# Patient Record
Sex: FEMALE | Race: WHITE | URBAN - METROPOLITAN AREA
[De-identification: names, ages, dates, MRNs, and addresses within clinical notes are randomized per-mention and may not be internally consistent; named-entity substitution may affect disease eponyms.]

---

## 2018-09-07 ENCOUNTER — HOSPITAL ENCOUNTER (OUTPATIENT)
Dept: MRI IMAGING | Age: 23
Discharge: HOME | End: 2018-09-07
Attending: ORTHOPAEDIC SURGERY
Payer: COMMERCIAL

## 2018-09-07 DIAGNOSIS — M21.862 GASTROCNEMIUS EQUINUS OF LEFT LOWER EXTREMITY: ICD-10-CM

## 2018-09-07 DIAGNOSIS — M79.672 LEFT FOOT PAIN: ICD-10-CM

## 2018-09-07 DIAGNOSIS — M76.62 TENDONITIS, ACHILLES, LEFT: ICD-10-CM

## 2018-09-07 DIAGNOSIS — M25.572 LEFT ANKLE PAIN, UNSPECIFIED CHRONICITY: ICD-10-CM

## 2018-09-07 PROBLEM — M62.462 GASTROCNEMIUS EQUINUS OF LEFT LOWER EXTREMITY: Status: ACTIVE | Noted: 2018-09-07

## 2018-09-07 PROCEDURE — 73721 MRI JNT OF LWR EXTRE W/O DYE: CPT | Performed by: ORTHOPAEDIC SURGERY

## 2018-09-13 PROBLEM — M92.62 HAGLUND'S DEFORMITY OF LEFT HEEL: Status: ACTIVE | Noted: 2018-09-13

## 2019-11-23 ENCOUNTER — APPOINTMENT (OUTPATIENT)
Dept: CT IMAGING | Facility: HOSPITAL | Age: 24
End: 2019-11-23
Attending: EMERGENCY MEDICINE
Payer: COMMERCIAL

## 2019-11-23 ENCOUNTER — HOSPITAL ENCOUNTER (EMERGENCY)
Facility: HOSPITAL | Age: 24
Discharge: HOME | End: 2019-11-23
Attending: EMERGENCY MEDICINE
Payer: COMMERCIAL

## 2019-11-23 VITALS
TEMPERATURE: 97 F | HEART RATE: 71 BPM | SYSTOLIC BLOOD PRESSURE: 126 MMHG | DIASTOLIC BLOOD PRESSURE: 72 MMHG | WEIGHT: 137 LBS | BODY MASS INDEX: 22 KG/M2 | OXYGEN SATURATION: 98 % | RESPIRATION RATE: 20 BRPM

## 2019-11-23 DIAGNOSIS — F10.920 ALCOHOLIC INTOXICATION WITHOUT COMPLICATION (HCC): ICD-10-CM

## 2019-11-23 DIAGNOSIS — S09.8XXA BLUNT HEAD TRAUMA, INITIAL ENCOUNTER: Primary | ICD-10-CM

## 2019-11-23 PROCEDURE — 99284 EMERGENCY DEPT VISIT MOD MDM: CPT

## 2019-11-23 PROCEDURE — 70450 CT HEAD/BRAIN W/O DYE: CPT | Performed by: EMERGENCY MEDICINE

## 2019-11-23 RX ORDER — ONDANSETRON 4 MG/1
4 TABLET, ORALLY DISINTEGRATING ORAL EVERY 4 HOURS PRN
Qty: 10 TABLET | Refills: 0 | Status: SHIPPED | OUTPATIENT
Start: 2019-11-23 | End: 2019-11-30

## 2019-11-23 RX ORDER — ONDANSETRON 4 MG/1
4 TABLET, ORALLY DISINTEGRATING ORAL ONCE
Status: COMPLETED | OUTPATIENT
Start: 2019-11-23 | End: 2019-11-23

## 2019-11-23 NOTE — ED INITIAL ASSESSMENT (HPI)
Pt presents s/p fall hitting face states she has been drinking tonight with no loc but positive vomiting

## 2019-11-23 NOTE — ED PROVIDER NOTES
Patient Seen in: [facility] Emergency Department      History   Patient presents with:  Fall (musculoskeletal, neurologic)  Alcohol Intoxication (neurologic)    Stated Complaint:     HPI    The patient's birthday is tonight and she was drinking heav Patient remains alert. Discharged home to push fluids. Rest.  Follow-up if further problems occur.               Disposition and Plan     Clinical Impression:  Blunt head trauma, initial encounter  (primary encounter diagnosis)  Alcoholic intoxication wi

## 2023-07-23 ENCOUNTER — HOSPITAL ENCOUNTER (OUTPATIENT)
Age: 28
Discharge: HOME | End: 2023-07-23
Payer: COMMERCIAL

## 2023-07-23 VITALS
HEART RATE: 70 BPM | TEMPERATURE: 98 F | SYSTOLIC BLOOD PRESSURE: 115 MMHG | DIASTOLIC BLOOD PRESSURE: 86 MMHG | HEIGHT: 66 IN | WEIGHT: 138 LBS | BODY MASS INDEX: 22.18 KG/M2 | RESPIRATION RATE: 18 BRPM | OXYGEN SATURATION: 96 %

## 2023-07-23 DIAGNOSIS — N89.8 VAGINAL ITCHING: Primary | ICD-10-CM

## 2023-07-23 LAB
B-HCG UR QL: NEGATIVE
POCT BILIRUBIN URINE: NEGATIVE
POCT BLOOD URINE: NEGATIVE
POCT GLUCOSE URINE: NEGATIVE MG/DL
POCT KETONE URINE: NEGATIVE MG/DL
POCT NITRITE URINE: NEGATIVE
POCT PH URINE: 7.5 (ref 5–8)
POCT PROTEIN URINE: NEGATIVE MG/DL
POCT SPECIFIC GRAVITY URINE: 1.01
POCT URINE CLARITY: CLEAR
POCT URINE COLOR: YELLOW
POCT UROBILINOGEN URINE: 0.2 MG/DL

## 2023-07-23 PROCEDURE — 87591 N.GONORRHOEAE DNA AMP PROB: CPT | Performed by: PHYSICIAN ASSISTANT

## 2023-07-23 PROCEDURE — 87510 GARDNER VAG DNA DIR PROBE: CPT | Performed by: PHYSICIAN ASSISTANT

## 2023-07-23 PROCEDURE — 87491 CHLMYD TRACH DNA AMP PROBE: CPT | Performed by: PHYSICIAN ASSISTANT

## 2023-07-23 PROCEDURE — 87660 TRICHOMONAS VAGIN DIR PROBE: CPT | Performed by: PHYSICIAN ASSISTANT

## 2023-07-23 PROCEDURE — 87480 CANDIDA DNA DIR PROBE: CPT | Performed by: PHYSICIAN ASSISTANT

## 2023-07-23 NOTE — ED INITIAL ASSESSMENT (HPI)
Pt c/o \"slight itch\" in vaginal region, hx of BV w/ menstrual, Pt just completed menstrual cycle.  [facility], but did not check for BV

## 2023-07-23 NOTE — DISCHARGE INSTRUCTIONS
We will call you  with your results  Follow up with gynecologist  No sexual activity until results finalize  Return to the ER if symptoms worsen

## 2023-07-24 LAB
C TRACH DNA SPEC QL NAA+PROBE: NEGATIVE
N GONORRHOEA DNA SPEC QL NAA+PROBE: NEGATIVE

## 2023-07-24 RX ORDER — FLUCONAZOLE 150 MG/1
150 TABLET ORAL
Qty: 2 TABLET | Refills: 0 | Status: SHIPPED | OUTPATIENT
Start: 2023-07-24

## 2024-12-19 ENCOUNTER — HOSPITAL ENCOUNTER (OUTPATIENT)
Age: 29
Discharge: HOME | End: 2024-12-19
Payer: COMMERCIAL

## 2024-12-19 VITALS
RESPIRATION RATE: 18 BRPM | TEMPERATURE: 98 F | OXYGEN SATURATION: 98 % | SYSTOLIC BLOOD PRESSURE: 110 MMHG | DIASTOLIC BLOOD PRESSURE: 74 MMHG | HEART RATE: 62 BPM

## 2024-12-19 DIAGNOSIS — Z11.3 SCREENING EXAMINATION FOR STD (SEXUALLY TRANSMITTED DISEASE): Primary | ICD-10-CM

## 2024-12-19 LAB
B-HCG UR QL: NEGATIVE
BILIRUB UR QL STRIP: NEGATIVE
COLOR UR: YELLOW
GLUCOSE UR STRIP-MCNC: NEGATIVE MG/DL
HGB UR QL STRIP: NEGATIVE
KETONES UR STRIP-MCNC: NEGATIVE MG/DL
LEUKOCYTE ESTERASE UR QL STRIP: NEGATIVE
NITRITE UR QL STRIP: NEGATIVE
PH UR STRIP: 6 [PH]
PROT UR STRIP-MCNC: NEGATIVE MG/DL
SP GR UR STRIP: 1.02
UROBILINOGEN UR STRIP-ACNC: <2 MG/DL

## 2024-12-19 PROCEDURE — 81025 URINE PREGNANCY TEST: CPT | Performed by: NURSE PRACTITIONER

## 2024-12-19 PROCEDURE — 81002 URINALYSIS NONAUTO W/O SCOPE: CPT | Performed by: NURSE PRACTITIONER

## 2024-12-19 PROCEDURE — 99214 OFFICE O/P EST MOD 30 MIN: CPT | Performed by: NURSE PRACTITIONER

## 2024-12-19 NOTE — ED PROVIDER NOTES
Patient Seen in: [facility]      History     Chief Complaint   Patient presents with    Sexually Transmitted Infection Screen     Entered by patient    CarleyalLIBRA     Stated Complaint: Sexually Transmitted Infection Screen    Subjective:   This is a 29-year-old female with no significant past medical history.  Presents to immediate care for vaginal irritation.  States she felt irritated after using a new soap a few days ago.  Patient discontinues with the soap and symptoms resolved.  No new sexual partners.  Requesting STI testing.  No fevers, abdominal pain, or urinary symptoms.  No rashes, lumps, or bumps in the genital area.    The history is provided by the patient.             Objective:     History reviewed. No pertinent past medical history.           Past Surgical History:   Procedure Laterality Date    Paragard, iud  08/26/2021    Dr Holt    Repair/graft achilles tendon Left     [employer] teeth removed      under general anesthesia, no complications                Social History     Socioeconomic History    Marital status: Single    Number of children: 0   Occupational History    Occupation: medical sales   Tobacco Use    Smoking status: Never    Smokeless tobacco: Never   Vaping Use    Vaping status: Never Used   Substance and Sexual Activity    Alcohol use: Yes     Alcohol/week: 0.0 standard drinks of alcohol     Comment: occ    Drug use: No    Sexual activity: Not Currently     Partners: Male     Birth control/protection: I.U.D., Mirena              Review of Systems   Constitutional:  Negative for chills and fever.   HENT:  Negative for congestion and sore throat.    Respiratory:  Negative for cough.    Cardiovascular:  Negative for chest pain.   Gastrointestinal:  Negative for abdominal pain, diarrhea, nausea and vomiting.   Genitourinary:  Negative for dysuria, flank pain, genital sores, hematuria, pelvic pain, urgency, vaginal bleeding, vaginal discharge and vaginal pain.    Musculoskeletal:  Negative for back pain, neck pain and neck stiffness.   Skin:  Negative for rash.       Positive for stated complaint: Sexually Transmitted Infection Screen  Other systems are as noted in HPI.  Constitutional and vital signs reviewed.      All other systems reviewed and negative except as noted above.    Physical Exam     ED Triage Vitals [12/19/24 1404]   /74   Pulse 62   Resp 18   Temp 98.4 °F (36.9 °C)   Temp src Oral   SpO2 98 %   O2 Device None (Room air)       Current Vitals:   Vital Signs  BP: 110/74  Pulse: 62  Resp: 18  Temp: 98.4 °F (36.9 °C)  Temp src: Oral    Oxygen Therapy  SpO2: 98 %  O2 Device: None (Room air)        Physical Exam  Vitals and nursing note reviewed. Exam conducted with a chaperone present.   Constitutional:       General: She is not in acute distress.     Appearance: Normal appearance. She is not ill-appearing, toxic-appearing or diaphoretic.   HENT:      Head: Normocephalic and atraumatic.      Right Ear: External ear normal.      Left Ear: External ear normal.      Nose: Nose normal.      Mouth/Throat:      Mouth: Mucous membranes are moist.      Pharynx: Oropharynx is clear.   Eyes:      General:         Right eye: No discharge.         Left eye: No discharge.      Extraocular Movements: Extraocular movements intact.      Conjunctiva/sclera: Conjunctivae normal.   Cardiovascular:      Rate and Rhythm: Normal rate.   Pulmonary:      Effort: Pulmonary effort is normal.   Abdominal:      General: Bowel sounds are normal.      Palpations: Abdomen is soft.      Tenderness: There is no abdominal tenderness.   Genitourinary:     General: Normal vulva.      Exam position: Supine.      Pubic Area: No rash or pubic lice.       Labia:         Right: No rash, tenderness, lesion or injury.         Left: No rash, tenderness, lesion or injury.       Vagina: No signs of injury and foreign body. Vaginal discharge present. No erythema, tenderness, bleeding, lesions or  prolapsed vaginal walls.      Cervix: Discharge and erythema present. No cervical motion tenderness, friability, lesion, cervical bleeding or eversion.   Musculoskeletal:      Cervical back: Neck supple.      Right lower leg: No edema.      Left lower leg: No edema.   Skin:     General: Skin is warm and dry.      Capillary Refill: Capillary refill takes less than 2 seconds.      Findings: No rash.   Neurological:      Mental Status: She is alert and oriented to person, place, and time.   Psychiatric:         Mood and Affect: Mood normal.         Behavior: Behavior normal.             ED Course     Labs Reviewed   [facility] POCT URINALYSIS DIPSTICK - Abnormal; Notable for the following components:       Result Value    Urine Clarity Slightly cloudy (*)     All other components within normal limits   POCT PREGNANCY URINE - Normal   VAGINITIS VAGINOSIS PCR PANEL   CHLAMYDIA/GONOCOCCUS, AYDE            UA, urine pregnancy, vaginal swabs       MDM      Vital signs stable.  Patient is well-appearing and nontoxic looking.  Presents to immediate care for STD testing.    Differential diagnosis includes but is not limited to UTI, pyelonephritis, PID, vaginitis, gonorrhea, chlamydia     Abdominal exam is benign.    UA appears uninfected.  Urine pregnancy is negative.      Nursing staff chaperone speculum pelvic exam.  Cervix is mildly erythemic with a thick white discharge.  No CM.  Swab sent off and pending.    Clinical impression BV versus Candida    DC home.  We will call her with positive swab results.  Advised to abstain from intercourse during testing.   PCP follow-up recommended.  Reasons to return reviewed.  Patient verbalized understanding, and agreed plan of care.  All questions answered.          Medical Decision Making      Disposition and Plan     Clinical Impression:  1. Screening examination for STD (sexually transmitted disease)         Disposition:  Discharge  12/19/2024  2:35 pm    Follow-up:  Araceli Edward  MD [facility]  [address] [facility]  [address]  [facility] [address]  [phone]      As needed          Medications Prescribed:  Current Discharge Medication List              Supplementary Documentation:

## 2024-12-19 NOTE — DISCHARGE INSTRUCTIONS
We will contact you if your cultures are positive.  Abstain from intercourse pending testing.  Follow closely with your primary.

## 2024-12-20 LAB
BV BACTERIA DNA VAG QL NAA+PROBE: NEGATIVE
C GLABRATA DNA VAG QL NAA+PROBE: NEGATIVE
C KRUSEI DNA VAG QL NAA+PROBE: NEGATIVE
C TRACH DNA SPEC QL NAA+PROBE: NEGATIVE
CANDIDA DNA VAG QL NAA+PROBE: POSITIVE
N GONORRHOEA DNA SPEC QL NAA+PROBE: NEGATIVE
T VAGINALIS DNA VAG QL NAA+PROBE: NEGATIVE

## 2024-12-20 RX ORDER — FLUCONAZOLE 150 MG/1
150 TABLET ORAL ONCE
Qty: 1 TABLET | Refills: 0 | Status: SHIPPED | OUTPATIENT
Start: 2024-12-20 | End: 2024-12-20

## 2024-12-20 NOTE — PROGRESS NOTES
Final vaginal panel results-  Antibiotic Rx-none  Other medications-none    Please review results

## 2024-12-21 NOTE — ED NOTES
Patient contacted and aware of negative Chlamydia/Gonococcus results and positive vaginal panel- Candida - patient was Rx'd Diflucan and patient states has picked up Rx   Patient verbalized understanding to RN

## 2025-06-23 ENCOUNTER — HOSPITAL ENCOUNTER (OUTPATIENT)
Age: 30
Discharge: HOME | End: 2025-06-23
Payer: COMMERCIAL

## 2025-06-23 VITALS
OXYGEN SATURATION: 97 % | TEMPERATURE: 99 F | SYSTOLIC BLOOD PRESSURE: 107 MMHG | DIASTOLIC BLOOD PRESSURE: 66 MMHG | HEART RATE: 75 BPM | RESPIRATION RATE: 18 BRPM

## 2025-06-23 DIAGNOSIS — A09 TRAVELER'S DIARRHEA: Primary | ICD-10-CM

## 2025-06-23 PROCEDURE — 99213 OFFICE O/P EST LOW 20 MIN: CPT

## 2025-06-23 RX ORDER — AZITHROMYCIN 500 MG/1
1000 TABLET, FILM COATED ORAL ONCE
Qty: 2 TABLET | Refills: 0 | Status: SHIPPED | OUTPATIENT
Start: 2025-06-23 | End: 2025-06-23

## 2025-06-23 RX ORDER — FAMOTIDINE 20 MG/1
20 TABLET, FILM COATED ORAL 2 TIMES DAILY PRN
Qty: 30 TABLET | Refills: 0 | Status: SHIPPED | OUTPATIENT
Start: 2025-06-23 | End: 2025-07-23

## 2025-06-23 RX ORDER — DICYCLOMINE HCL 20 MG
20 TABLET ORAL 3 TIMES DAILY PRN
Qty: 30 TABLET | Refills: 0 | Status: SHIPPED | OUTPATIENT
Start: 2025-06-23 | End: 2025-07-23

## 2025-06-23 NOTE — ED PROVIDER NOTES
History     Chief Complaint   Patient presents with    Diarrhea     Stomach pain - Entered by patient       Subjective:   HPI    Radha Anderson, 29 year old female with no medical hx who presents with diarrhea and abdominal cramping since Saturday.  Patient returned from [place] on Friday.  Denies any fevers, chills.  She reports that she is extremely gassy and has watery diarrhea.  Only had 1 episode today.  Denies any blood in stool.  She has not tried any medications.  Denies any nausea or emesis she has been able to tolerate soup and crackers.  Denies any abdominal surgeries.  Denies any urinary or vaginal symptoms.      Problem List[1]   Objective:   No pertinent past medical history.            No pertinent past surgical history.              No pertinent social history.            Medications Ordered Prior to Encounter[2]      Constitutional and vital signs reviewed.      All other systems reviewed and negative except as noted above.    I have reviewed the family history, social history, allergies, and outpatient medications.     History reviewed from EMR: Encounters, problem list, allergies, medications      Physical Exam     ED Triage Vitals [06/23/25 1141]   /66   Pulse 75   Resp 18   Temp 98.5 °F (36.9 °C)   Temp src Oral   SpO2 97 %   O2 Device None (Room air)       Current:/66   Pulse 75   Temp 98.5 °F (36.9 °C) (Oral)   Resp 18   LMP 12/11/2024   SpO2 97%       Physical Exam  Vitals and nursing note reviewed.   Constitutional:       General: She is not in acute distress.     Appearance: Normal appearance. She is not ill-appearing or toxic-appearing.   HENT:      Head: Normocephalic and atraumatic.      Right Ear: External ear normal.      Left Ear: External ear normal.      Nose: Nose normal.   Eyes:      General:         Right eye: No discharge.         Left eye: No discharge.      Extraocular Movements: Extraocular movements intact.      Conjunctiva/sclera: Conjunctivae  normal.      Pupils: Pupils are equal, round, and reactive to light.   Cardiovascular:      Rate and Rhythm: Normal rate and regular rhythm.      Pulses: Normal pulses.      Heart sounds: Normal heart sounds.   Pulmonary:      Effort: Pulmonary effort is normal.      Breath sounds: Normal breath sounds.   Abdominal:      General: Abdomen is flat. Bowel sounds are normal.      Palpations: Abdomen is soft. There is no hepatomegaly, splenomegaly or mass.      Tenderness: There is no abdominal tenderness. There is no guarding. Negative signs include Balbuena's sign and McBurney's sign.      Hernia: No hernia is present.      Comments: Palpable gas on exam   Musculoskeletal:      Cervical back: Normal range of motion and neck supple.   Skin:     General: Skin is warm and dry.      Coloration: Skin is not jaundiced or pale.   Neurological:      General: No focal deficit present.      Mental Status: She is alert and oriented to person, place, and time.   Psychiatric:         Mood and Affect: Mood normal.         Behavior: Behavior normal.            ED Course     Labs Reviewed - No data to display  No orders to display       Vitals:    06/23/25 1141   BP: 107/66   Pulse: 75   Resp: 18   Temp: 98.5 °F (36.9 °C)   TempSrc: Oral   SpO2: 97%            [facility]        Radhasulaiman Graham Manious, 29 year old female with medical history as noted above who presents with diarrhea     -Vital signs stable.  No apparent distress.  Patient is afebrile.  Lauren physical exam were performed to assess for dehydration, red flags such as bloody stools, fevers, severe abdominal pain and recent exposures or travel.  Picture is most consistent with uncomplicated likely viral or noninflammatory bacterial diarrhea as there are no signs of severe dehydration, sepsis.  Discussed with patient oral rehydration with fluids containing electrolytes and antimotility agents such as Imodium can be considered for symptomatic relief.  Discussed Bentyl for cramping  pain.  -Discussed BRAT diet, infection control  The patient is encouraged to return if any concerning symptoms arise. Additional verbal discharge instructions are given and discussed. Discharge medications are discussed. The patient is in good condition throughout the visit today and remains so upon discharge. I discuss the plan of care with the patient, who expresses understanding. All questions and concerns are addressed to the patient's satisfaction prior to discharge today. ED precautions discussed.       ** See ED course below for additional information on care provided / interventions / notable events throughout patient's encounter.           ** I have independently reviewed the radiology images, clinical lab results, and ECG tracings as described above (if applicable)    ** Concerning co-morbidities possibly affecting complaint / care: None        Medical Decision Making  Risk  OTC drugs.  Prescription drug management.        Disposition and Plan     Disposition:  Discharge  6/23/2025 12:32 pm    Clinical Impression:  1. Traveler's diarrhea        Follow-up:  Araceli Edward MD  [address] [address]  [phone]                Medications Prescribed:  Discharge Medication List as of 6/23/2025 12:35 PM        START taking these medications    Details   dicyclomine 20 MG Oral Tab Take 1 tablet (20 mg total) by mouth 3 (three) times daily as needed., Normal, Disp-30 tablet, R-0      famotidine (PEPCID) 20 MG Oral Tab Take 1 tablet (20 mg total) by mouth 2 (two) times daily as needed for Heartburn., Normal, Disp-30 tablet, R-0      azithromycin 500 MG Oral Tab Take 2 tablets (1,000 mg total) by mouth once for 1 dose., Normal, Disp-2 tablet, R-0               Elo, DNP, APRN, FNP-BC  Family Nurse Practitioner  [facility]      The above patient (and/or guardian) was made aware that an appropriate evaluation has been performed, and that no additional testing is  required at this time. In my medical judgment, there is currently no evidence of an immediate life-threatening or surgical condition, therefore discharge is indicated at this time. The patient (and/or guardian) was advised that a small risk still exists that a serious condition could develop. The patient was instructed to arrange close follow-up with their primary care provider (or the referral provider given today). The patient received written and verbal instructions regarding their condition / concerns, demonstrated understanding, and is agreement with the outpatient treatment plan.            [1]   Patient Active Problem List  Diagnosis    Gastrocnemius equinus of left lower extremity    Tendonitis, Achilles, left    Sx 10/5/18, Left Excision Joan w/ Dr. [employer] Exp 1/3/18    [2]   No current facility-administered medications on file prior to encounter.     Current Outpatient Medications on File Prior to Encounter   Medication Sig Dispense Refill    fluconazole (DIFLUCAN) 150 MG Oral Tab Take 1 tablet (150 mg total) by mouth every 3 (three) days. Repeat dose every 72 hours if not improved 2 tablet 0    PARAGARD INTRAUTERINE COPPER IU by Intrauterine route. (Patient not taking: Reported on 12/19/2024)      amphetamine-dextroamphetamine 20 MG Oral Tab TAKE ONE TABLET BY MOUTH EVERY AFTERNOON AT 3PM. (Patient not taking: Reported on 12/19/2024)      Lisdexamfetamine Dimesylate 60 MG Oral Cap Take 60 mg by mouth every morning. (Patient not taking: Reported on 12/19/2024)

## 2025-06-23 NOTE — DISCHARGE INSTRUCTIONS
You have been diagnosed with diarrhea which is most often caused by a virus or mild bacterial infection and usually gets better on its own within a few days.    Drink plenty of fluids such as water, clear broths, oral rehydration things like Pedialyte.  Eat small frequent meals, avoid spicy greasy and heavy foods.  BRAT diet (bananas, rice, applesauce, toast ) Advance your diet as tolerated.  You may use over-the-counter medications such as Imodium.  Wash your hands frequently.  You can use a probiotic    Return or seek medical attention if you have any of the following; blood in your stool, high fever, severe abdominal pain, signs of dehydration.

## 2025-06-23 NOTE — ED INITIAL ASSESSMENT (HPI)
Pt reports diarrhea and abdominal pain since Saturday, just got back from [place]. Pt states her symptoms are improving today however she ate some soup and had stomach cramping for ~ 25 minutes.

## (undated) NOTE — ED AVS SNAPSHOT
Lee Ann Ramon   MRN: [id number]    Department:  [facility] Emergency Department   Date of Visit:  11/23/2019           Disclosure     Insurance plans vary and the physician(s) referred by the ER may not be covered by your plan.  Please contact yo tell this physician (or your personal doctor if your instructions are to return to your personal doctor) about any new or lasting problems. The primary care or specialist physician will see patients referred from the [facility] Emergency Department.  Jo Ann Ahn

## (undated) NOTE — LETTER
Date & Time: 12/13/2019, 11:09 PM  Patient: Yamila Fairly  Encounter Provider(s):    Albertina Casillas MD       To Whom It May Concern:    Michael Yun was seen and treated in our department on 11/23/2019.    If you have any questions or concerns,

## (undated) NOTE — LETTER
Date & Time: 12/13/2019, 11:45 PM  Patient: Kika De Jesus  Encounter Provider(s):    Mikey Chiu MD       To Whom It May Concern:    Walt Leija was seen and treated in our department on 11/23/2019.    If you have any questions or concerns,

## (undated) NOTE — LETTER
Date & Time: 12/13/2019, 11:47 AM  Patient: Roseline Loredo  Encounter Provider(s):    Leopold Crooks, MD       To Whom It May Concern:    Hailee Betts was seen and treated in our department on 11/23/2019.    If you have any questions or concerns,